# Patient Record
Sex: MALE | Race: WHITE | Employment: FULL TIME | URBAN - METROPOLITAN AREA
[De-identification: names, ages, dates, MRNs, and addresses within clinical notes are randomized per-mention and may not be internally consistent; named-entity substitution may affect disease eponyms.]

---

## 2020-11-01 ENCOUNTER — APPOINTMENT (OUTPATIENT)
Dept: VASCULAR SURGERY | Age: 35
End: 2020-11-01
Attending: EMERGENCY MEDICINE
Payer: COMMERCIAL

## 2020-11-01 ENCOUNTER — HOSPITAL ENCOUNTER (EMERGENCY)
Age: 35
Discharge: HOME | End: 2020-11-01
Attending: EMERGENCY MEDICINE
Payer: COMMERCIAL

## 2020-11-01 VITALS
RESPIRATION RATE: 15 BRPM | DIASTOLIC BLOOD PRESSURE: 92 MMHG | SYSTOLIC BLOOD PRESSURE: 143 MMHG | OXYGEN SATURATION: 95 % | HEART RATE: 69 BPM | TEMPERATURE: 98 F | BODY MASS INDEX: 35.29 KG/M2 | HEIGHT: 74 IN | WEIGHT: 275 LBS

## 2020-11-01 DIAGNOSIS — L03.116 CELLULITIS OF LEFT LOWER EXTREMITY: Primary | ICD-10-CM

## 2020-11-01 LAB — D DIMER PPP FEU-MCNC: 0.94 MG/L FEU (ref 0–0.65)

## 2020-11-01 PROCEDURE — 99283 EMERGENCY DEPT VISIT LOW MDM: CPT

## 2020-11-01 PROCEDURE — 93971 EXTREMITY STUDY: CPT

## 2020-11-01 PROCEDURE — 36415 COLL VENOUS BLD VENIPUNCTURE: CPT

## 2020-11-01 PROCEDURE — 85379 FIBRIN DEGRADATION QUANT: CPT

## 2020-11-01 PROCEDURE — 74011250637 HC RX REV CODE- 250/637: Performed by: EMERGENCY MEDICINE

## 2020-11-01 RX ORDER — CEPHALEXIN 250 MG/1
500 CAPSULE ORAL
Status: COMPLETED | OUTPATIENT
Start: 2020-11-01 | End: 2020-11-01

## 2020-11-01 RX ORDER — CEPHALEXIN 500 MG/1
500 CAPSULE ORAL 4 TIMES DAILY
Qty: 28 CAP | Refills: 0 | Status: SHIPPED | OUTPATIENT
Start: 2020-11-01 | End: 2020-11-08

## 2020-11-01 RX ADMIN — CEPHALEXIN 500 MG: 250 CAPSULE ORAL at 23:24

## 2020-11-02 NOTE — DISCHARGE INSTRUCTIONS
Take Benadryl at night for itching. Take Zyrtec during the day for itching. Take Keflex 4 times a day for the next 7 days. Return to the emergency department for any new or worsening symptoms such as chest pain, shortness of breath, worsening leg swelling, worsening redness, fever or any other concerns.

## 2020-11-02 NOTE — ED NOTES
I have reviewed discharge instructions with the patient. The patient verbalized understanding. Pt confirmed understanding of need for follow up with primary care provider. Pt is not in any current distress and shows no evidence of clinical instability. Pt left ambulatory  Pt family/friends are present. Pt left with all personal belongings. Pt states chief complaint has  improved with treatment provided    PT is alert and oriented x 4, Respiratory status is WDL, Cardiac system is HTN (patient has not taken BP medication today, educated on importance of taking daily BP medication, patient to take medication when he gets home)    Paperwork given and reviewed with patient, opportunity for questions/clarification given.     Patient Vitals for the past 4 hrs:   Temp Pulse Resp BP SpO2   11/01/20 2327  69 15 (!) 143/92 95 %   11/01/20 1932 98 °F (36.7 °C) 93 17 (!) 145/81 96 %

## 2020-11-02 NOTE — ED PROVIDER NOTES
38-year-old male presents with left ankle swelling for the past 2 days. States that it is itching for the past week. He thinks that he has an infection from scratching his ankle. He denies any fevers or chills. He denies any chest pain or shortness of breath there is no other medical concerns at this time. Ankle swelling           No past medical history on file. No past surgical history on file. No family history on file. Social History     Socioeconomic History    Marital status:      Spouse name: Not on file    Number of children: Not on file    Years of education: Not on file    Highest education level: Not on file   Occupational History    Not on file   Social Needs    Financial resource strain: Not on file    Food insecurity     Worry: Not on file     Inability: Not on file    Transportation needs     Medical: Not on file     Non-medical: Not on file   Tobacco Use    Smoking status: Not on file   Substance and Sexual Activity    Alcohol use: Not on file    Drug use: Not on file    Sexual activity: Not on file   Lifestyle    Physical activity     Days per week: Not on file     Minutes per session: Not on file    Stress: Not on file   Relationships    Social connections     Talks on phone: Not on file     Gets together: Not on file     Attends Episcopalian service: Not on file     Active member of club or organization: Not on file     Attends meetings of clubs or organizations: Not on file     Relationship status: Not on file    Intimate partner violence     Fear of current or ex partner: Not on file     Emotionally abused: Not on file     Physically abused: Not on file     Forced sexual activity: Not on file   Other Topics Concern    Not on file   Social History Narrative    Not on file         ALLERGIES: Patient has no known allergies. Review of Systems   All other systems reviewed and are negative.       Vitals:    11/01/20 1932   BP: (!) 145/81   Pulse: 93   Resp: 17   Temp: 98 °F (36.7 °C)   SpO2: 96%   Weight: 124.7 kg (275 lb)   Height: 6' 2\" (1.88 m)            Physical Exam  Vitals signs and nursing note reviewed. Constitutional:       Appearance: He is well-developed. HENT:      Head: Normocephalic and atraumatic. Eyes:      General: No scleral icterus. Neck:      Musculoskeletal: No neck rigidity. Trachea: No tracheal deviation. Cardiovascular:      Rate and Rhythm: Normal rate. Pulmonary:      Effort: Pulmonary effort is normal.   Abdominal:      General: There is no distension. Musculoskeletal:         General: No deformity. Left lower leg: Edema present. Skin:     General: Skin is warm and dry. Findings: Erythema (Mild left lower leg.) present. Neurological:      General: No focal deficit present. Mental Status: He is alert. Psychiatric:         Mood and Affect: Mood normal.          MDM  Number of Diagnoses or Management Options  Cellulitis of left lower extremity:   Diagnosis management comments: Ultrasound negative. Will treat empircally for cellulitis with keflex. Advised close pcp followup. Given return precautions.             Procedures

## 2020-11-02 NOTE — ED TRIAGE NOTES
Pt ambulatory to triage with mask, c/o left ankle swelling x 2 days and itching x 1 week. Pt states \" I think I have infection. I have been scratching my ankle and it started to swell. \"

## 2020-11-13 ENCOUNTER — APPOINTMENT (OUTPATIENT)
Dept: GENERAL RADIOLOGY | Age: 35
End: 2020-11-13
Attending: PHYSICIAN ASSISTANT
Payer: COMMERCIAL

## 2020-11-13 ENCOUNTER — HOSPITAL ENCOUNTER (EMERGENCY)
Age: 35
Discharge: HOME | End: 2020-11-13
Attending: STUDENT IN AN ORGANIZED HEALTH CARE EDUCATION/TRAINING PROGRAM
Payer: COMMERCIAL

## 2020-11-13 VITALS
BODY MASS INDEX: 35.29 KG/M2 | DIASTOLIC BLOOD PRESSURE: 109 MMHG | TEMPERATURE: 98.1 F | OXYGEN SATURATION: 98 % | HEART RATE: 73 BPM | RESPIRATION RATE: 15 BRPM | SYSTOLIC BLOOD PRESSURE: 159 MMHG | WEIGHT: 275 LBS | HEIGHT: 74 IN

## 2020-11-13 DIAGNOSIS — R03.0 ELEVATED BLOOD PRESSURE READING: ICD-10-CM

## 2020-11-13 DIAGNOSIS — L03.116 CELLULITIS OF LEFT LOWER EXTREMITY: Primary | ICD-10-CM

## 2020-11-13 LAB
ALBUMIN SERPL-MCNC: 4.2 G/DL (ref 3.5–5)
ALBUMIN/GLOB SERPL: 1.1 {RATIO} (ref 1.1–2.2)
ALP SERPL-CCNC: 97 U/L (ref 45–117)
ALT SERPL-CCNC: 56 U/L (ref 12–78)
ANION GAP SERPL CALC-SCNC: 4 MMOL/L (ref 5–15)
AST SERPL-CCNC: 26 U/L (ref 15–37)
BASOPHILS # BLD: 0.2 K/UL (ref 0–0.1)
BASOPHILS NFR BLD: 2 % (ref 0–1)
BILIRUB SERPL-MCNC: 0.6 MG/DL (ref 0.2–1)
BUN SERPL-MCNC: 16 MG/DL (ref 6–20)
BUN/CREAT SERPL: 16 (ref 12–20)
CALCIUM SERPL-MCNC: 9 MG/DL (ref 8.5–10.1)
CHLORIDE SERPL-SCNC: 107 MMOL/L (ref 97–108)
CO2 SERPL-SCNC: 26 MMOL/L (ref 21–32)
CREAT SERPL-MCNC: 0.98 MG/DL (ref 0.7–1.3)
DIFFERENTIAL METHOD BLD: ABNORMAL
EOSINOPHIL # BLD: 1.9 K/UL (ref 0–0.4)
EOSINOPHIL NFR BLD: 18 % (ref 0–7)
ERYTHROCYTE [DISTWIDTH] IN BLOOD BY AUTOMATED COUNT: 12.1 % (ref 11.5–14.5)
GLOBULIN SER CALC-MCNC: 3.7 G/DL (ref 2–4)
GLUCOSE SERPL-MCNC: 93 MG/DL (ref 65–100)
HCT VFR BLD AUTO: 45.2 % (ref 36.6–50.3)
HGB BLD-MCNC: 15.5 G/DL (ref 12.1–17)
IMM GRANULOCYTES # BLD AUTO: 0 K/UL
IMM GRANULOCYTES NFR BLD AUTO: 0 %
LYMPHOCYTES # BLD: 2.7 K/UL (ref 0.8–3.5)
LYMPHOCYTES NFR BLD: 26 % (ref 12–49)
MCH RBC QN AUTO: 30.5 PG (ref 26–34)
MCHC RBC AUTO-ENTMCNC: 34.3 G/DL (ref 30–36.5)
MCV RBC AUTO: 89 FL (ref 80–99)
MONOCYTES # BLD: 1 K/UL (ref 0–1)
MONOCYTES NFR BLD: 10 % (ref 5–13)
MYELOCYTES NFR BLD MANUAL: 1 %
NEUTS SEG # BLD: 4.4 K/UL (ref 1.8–8)
NEUTS SEG NFR BLD: 43 % (ref 32–75)
NRBC # BLD: 0 K/UL (ref 0–0.01)
NRBC BLD-RTO: 0 PER 100 WBC
PLATELET # BLD AUTO: 219 K/UL (ref 150–400)
PMV BLD AUTO: 10.2 FL (ref 8.9–12.9)
POTASSIUM SERPL-SCNC: 4 MMOL/L (ref 3.5–5.1)
PROT SERPL-MCNC: 7.9 G/DL (ref 6.4–8.2)
RBC # BLD AUTO: 5.08 M/UL (ref 4.1–5.7)
RBC MORPH BLD: ABNORMAL
SODIUM SERPL-SCNC: 137 MMOL/L (ref 136–145)
WBC # BLD AUTO: 10.3 K/UL (ref 4.1–11.1)

## 2020-11-13 PROCEDURE — 80053 COMPREHEN METABOLIC PANEL: CPT

## 2020-11-13 PROCEDURE — 73610 X-RAY EXAM OF ANKLE: CPT

## 2020-11-13 PROCEDURE — 85025 COMPLETE CBC W/AUTO DIFF WBC: CPT

## 2020-11-13 PROCEDURE — 36415 COLL VENOUS BLD VENIPUNCTURE: CPT

## 2020-11-13 PROCEDURE — 99281 EMR DPT VST MAYX REQ PHY/QHP: CPT

## 2020-11-13 RX ORDER — DOXYCYCLINE HYCLATE 100 MG
100 TABLET ORAL 2 TIMES DAILY
Qty: 20 TAB | Refills: 0 | Status: SHIPPED | OUTPATIENT
Start: 2020-11-13 | End: 2020-11-23

## 2020-11-13 NOTE — ED PROVIDER NOTES
Date of Service:  11/13/2020    Patient:  Adry Quevedo    Chief Complaint:  Hypertension and Skin Problem       HPI:  Adry Quevedo is a 52-year-old male with no significant past medical history presenting today for left ankle swelling, redness. Was seen about a week and a half ago, placed on Keflex 4 times daily, stated that he finished the Keflex a couple days ago with improvement of symptoms, but not complete resolution. Since then states he continues to notice redness and swelling on his left ankle, states he has been scratching at it, but denies any open wounds. Patient denies any fever or chills, chest pain, shortness of breath, abdominal pain, nausea, vomiting, diarrhea. Denies any known trauma or injury to left ankle, denies any bug bites or scratches. Patient also endorses elevated blood pressure for the past couple days, denies headaches, vision changes, chest pain. No past medical history on file. No past surgical history on file. No family history on file.     Social History     Socioeconomic History    Marital status:      Spouse name: Not on file    Number of children: Not on file    Years of education: Not on file    Highest education level: Not on file   Occupational History    Not on file   Social Needs    Financial resource strain: Not on file    Food insecurity     Worry: Not on file     Inability: Not on file    Transportation needs     Medical: Not on file     Non-medical: Not on file   Tobacco Use    Smoking status: Not on file   Substance and Sexual Activity    Alcohol use: Not on file    Drug use: Not on file    Sexual activity: Not on file   Lifestyle    Physical activity     Days per week: Not on file     Minutes per session: Not on file    Stress: Not on file   Relationships    Social connections     Talks on phone: Not on file     Gets together: Not on file     Attends Pentecostal service: Not on file     Active member of club or organization: Not on file     Attends meetings of clubs or organizations: Not on file     Relationship status: Not on file    Intimate partner violence     Fear of current or ex partner: Not on file     Emotionally abused: Not on file     Physically abused: Not on file     Forced sexual activity: Not on file   Other Topics Concern    Not on file   Social History Narrative    Not on file         ALLERGIES: Patient has no known allergies. Review of Systems   Constitutional: Negative for chills and fever. HENT: Negative for congestion and sore throat. Eyes: Negative for pain. Respiratory: Negative for cough and shortness of breath. Cardiovascular: Negative for chest pain and palpitations. Gastrointestinal: Negative for abdominal pain, diarrhea, nausea and vomiting. Genitourinary: Negative for dysuria, frequency and urgency. Musculoskeletal: Negative for back pain and neck pain. Skin: Negative for rash and wound. Neurological: Negative for dizziness, light-headedness and headaches. Vitals:    11/13/20 1353   BP: (!) 159/109   Pulse: 73   Resp: 15   Temp: 98.1 °F (36.7 °C)   SpO2: 98%   Weight: 124.7 kg (275 lb)   Height: 6' 2\" (1.88 m)            Physical Exam  Vitals signs and nursing note reviewed. Constitutional:       Appearance: Normal appearance. HENT:      Head: Normocephalic and atraumatic. Nose: Nose normal.   Eyes:      Extraocular Movements: Extraocular movements intact. Conjunctiva/sclera: Conjunctivae normal.      Pupils: Pupils are equal, round, and reactive to light. Neck:      Musculoskeletal: Normal range of motion and neck supple. Cardiovascular:      Rate and Rhythm: Normal rate and regular rhythm. Pulses: Normal pulses. Radial pulses are 2+ on the right side and 2+ on the left side. Posterior tibial pulses are 2+ on the right side and 2+ on the left side. Heart sounds: Normal heart sounds.    Pulmonary:      Effort: Pulmonary effort is normal.      Breath sounds: Normal breath sounds. Abdominal:      General: Abdomen is flat. Bowel sounds are normal.      Palpations: Abdomen is soft. Tenderness: There is no abdominal tenderness. There is no right CVA tenderness, left CVA tenderness, guarding or rebound. Musculoskeletal: Normal range of motion. Skin:     General: Skin is warm and dry. Capillary Refill: Capillary refill takes less than 2 seconds. Neurological:      General: No focal deficit present. Mental Status: He is alert and oriented to person, place, and time. Mental status is at baseline. Psychiatric:         Mood and Affect: Mood normal.         Behavior: Behavior normal.         Thought Content: Thought content normal.          MDM  Number of Diagnoses or Management Options  Cellulitis of left lower extremity:   Elevated blood pressure reading:   Diagnosis management comments: Labs Reviewed  CBC WITH AUTOMATED DIFF - Abnormal; Notable for the following components:     EOSINOPHILS                   18 (*)                 BASOPHILS                     2 (*)                  MYELOCYTES                    1 (*)                  ABS. EOSINOPHILS              1.9 (*)                ABS. BASOPHILS                0.2 (*)             All other components within normal limits  METABOLIC PANEL, COMPREHENSIVE - Abnormal; Notable for the following components:     Anion gap                     4 (*)               All other components within normal limits    IMAGING:  XR ANKLE LT MIN 3 V   Final Result    IMPRESSION: No acute abnormality. DECISION MAKING:  Adry Quevedo is a 38 y.o. male who comes in as above. Left ankle swelling, redness, previously on keflex with improvement, but not complete resolution of symptoms. Has been off antibiotics for about 2-3 days, and continues to notice symptoms. Also endorses elevated blood pressure readings for the past 2-3 days.  Denies headaches, vision changes, chest pain, or any additional symptoms. Patient resting comfortably, no acute distress. Denies trauma/injury to ankle. Weight bearing, full ROM, sensation intact, 2+ pulses, left lateral ankle erythematous, TTP. Full ROM without pain. Blood work, imaging reassuring, no clear etiology for symptoms. All results discussed with patient. Patient management discussed with attending physician. Start doxycyline BID x 10 days, please see PCP within 2 days for wound check and blood pressure check/management. Return to ED if any worsening or severe symptoms. IMPRESSION:  Cellulitis of left lower extremity  (primary encounter diagnosis)  Elevated blood pressure reading    DISPOSITION:  Discharged      Discharge Medication List as of 11/13/2020  3:17 PM         Follow-up Information     Follow up With Specialties Details Why Contact Info    Onofre Ornelas MD Family Medicine Schedule an appointment as soon as   possible for a visit in 1 week  [address] [facility]  [phone]      [facility] EMERGENCY DEPT Emergency Medicine Go to  If symptoms worsen [address] [place] [address]  [phone]          The patient is asked to follow-up with their primary care provider in the next several days. They are to call tomorrow for an appointment. The patient is asked to return promptly for any increased concerns or worsening of symptoms. They can return to this emergency department or any other emergency department. ED Course as of Nov 13 1506 Fri Nov 13, 2020   1358 1:58 PM  I have evaluated the patient as the Provider in Triage. I have reviewed His vital signs and the triage nurse assessment. I have talked with the patient and any available family and advised that I am the provider in triage and have ordered the appropriate study to initiate their work up based on the clinical presentation during my assessment.   I have advised that the patient will be accommodated in the Main ED as soon as possible. I have also requested to contact the triage nurse or myself immediately if the patient experiences any changes in their condition during this brief waiting period.   Heriberto Monet    [EK]      ED Course User Index  [EK] Josefa Booker PA-C       Procedures

## 2020-11-13 NOTE — Clinical Note
[address] 
[facility] EMERGENCY DEPT 
[address] [facility] [id number] [phone] Work/School Note Date: 11/13/2020 To Whom It May concern: 
 
 
Heron Arrington was seen and treated today in the emergency room by the following provider(s): 
Attending Provider: Florinda Vera DO Physician Assistant: Emerson Toney PA-C. Heron Arrington is excused from work/school on 11/13/20. He is clear to return to work/school on 11/14/20. Sincerely, Karoline Padilla PA-C

## 2020-11-13 NOTE — LETTER
Kris Duckworth 
[facility] EMERGENCY DEPT 
[address] [facility] [place] [id number] [phone] Work/School Note Date: 11/13/2020 To Whom It May concern: 
 
Terese Kerr was seen and treated today in the emergency room by the following provider(s): 
Attending Provider: Satya Aguilar DO Physician Assistant: Tanner Alvarez PA-C. Please keep left leg elevated above level of heart Sincerely, Jorden Jules PA-C

## 2020-11-13 NOTE — ED TRIAGE NOTES
Dx'd with cellulitis to left foot 2 weeks ago. Reports improvement of swelling after keflex. States that he did not take keflex as rx'd because medicine was \"harsh\" and could not take  Now presents with \"ulcer\" to left ankle. Circular area is red without ulceration.      Took hydrochlorothiazide PTA

## 2020-12-31 ENCOUNTER — TELEPHONE (OUTPATIENT)
Dept: FAMILY MEDICINE CLINIC | Age: 35
End: 2020-12-31

## 2020-12-31 NOTE — TELEPHONE ENCOUNTER
Called pt, did not receive an answer and unable to leave a message. Dr. Haleigh Rincon is able to see pt in February. Pt will be seen for cellulitis.

## 2021-04-12 ENCOUNTER — HOSPITAL ENCOUNTER (OUTPATIENT)
Dept: VASCULAR SURGERY | Age: 36
Discharge: HOME | End: 2021-04-12
Attending: FAMILY MEDICINE
Payer: COMMERCIAL

## 2021-04-12 ENCOUNTER — TRANSCRIBE ORDER (OUTPATIENT)
Dept: SCHEDULING | Age: 36
End: 2021-04-12

## 2021-04-12 DIAGNOSIS — M79.643 HAND PAIN: ICD-10-CM

## 2021-04-12 DIAGNOSIS — R79.89 POSITIVE D DIMER: ICD-10-CM

## 2021-04-12 DIAGNOSIS — R79.89 POSITIVE D DIMER: Primary | ICD-10-CM

## 2021-04-12 PROCEDURE — 93971 EXTREMITY STUDY: CPT

## 2021-04-13 ENCOUNTER — OFFICE VISIT (OUTPATIENT)
Dept: CARDIOLOGY CLINIC | Age: 36
End: 2021-04-13

## 2021-04-13 VITALS
RESPIRATION RATE: 18 BRPM | OXYGEN SATURATION: 99 % | HEART RATE: 87 BPM | BODY MASS INDEX: 33.75 KG/M2 | SYSTOLIC BLOOD PRESSURE: 140 MMHG | DIASTOLIC BLOOD PRESSURE: 80 MMHG | WEIGHT: 263 LBS | HEIGHT: 74 IN

## 2021-04-13 DIAGNOSIS — R07.89 CHEST TIGHTNESS: ICD-10-CM

## 2021-04-13 DIAGNOSIS — I73.1: ICD-10-CM

## 2021-04-13 DIAGNOSIS — F17.200 SMOKER: ICD-10-CM

## 2021-04-13 DIAGNOSIS — R20.0 NUMBNESS OF FINGERS: Primary | ICD-10-CM

## 2021-04-13 DIAGNOSIS — R09.89 ABSENT PULSE IN UPPER EXTREMITY: ICD-10-CM

## 2021-04-13 PROCEDURE — 99244 OFF/OP CNSLTJ NEW/EST MOD 40: CPT | Performed by: SPECIALIST

## 2021-04-13 PROCEDURE — 93000 ELECTROCARDIOGRAM COMPLETE: CPT | Performed by: SPECIALIST

## 2021-04-13 RX ORDER — NAPROXEN 500 MG/1
TABLET ORAL
COMMUNITY
Start: 2021-04-09 | End: 2021-04-17

## 2021-04-13 RX ORDER — MELATONIN 5 MG
5 CAPSULE ORAL
COMMUNITY

## 2021-04-13 RX ORDER — DIPHENHYDRAMINE HCL 25 MG
25 CAPSULE ORAL
COMMUNITY

## 2021-04-13 RX ORDER — AMLODIPINE BESYLATE 2.5 MG/1
TABLET ORAL
COMMUNITY
Start: 2021-04-09 | End: 2021-04-17 | Stop reason: SDUPTHER

## 2021-04-13 NOTE — PROGRESS NOTES
Visit Vitals  BP (!) 140/80 (BP 1 Location: Left arm, BP Patient Position: Sitting, BP Cuff Size: Adult)   Pulse 87   Resp 18   Ht 6' 2\" (1.88 m)   Wt 263 lb (119.3 kg)   SpO2 99%   BMI 33.77 kg/m²

## 2021-04-13 NOTE — PROGRESS NOTES
Mauricio Soares     1985       Juan C Heard MD, [facility]  Date of Visit-4/13/2021   PCP is Joanne Aguirre MD   [facility] [place]  [facility]  HPI:  Mauricio Soares is a 38 y.o. male   New pt referred by Joanne Aguirre MD. He reports feeling difficulty with his fingers and arterial insufficiency. Takes Amlodipine. We have records from his PCP noting pain and swelling in his fingers. He is a student in nursing school. Smokes a half pack a day with daily alcohol. He has an LDL cholesterol of 139. He had a venous upper extremity doppler yesterday of the right which was normal.     He states that he has pain in the fingers on his right hand. The pain started in November and his fingers also started to turn blue when the cold or touching cold objects around the same time. He notes his fingers also turn blue if he runs hot water over them. He reports that the fingers on his left hand are starting to turn blue too. He has never had surgery on his right arm. He states he started smoking again in the fall and is trying to stop. He states the last time he smoked was Thursday. Denies chest pain, edema, syncope or shortness of breath at rest, has no tachycardia, palpitations or sense of arrhythmia. EKG: SR WNL normal axis and intervals     Assessment/Plan:     · His right three middle fingers are bluish after he had immersed them in water and the nail beds are also bluish. · There are strong radials bilaterally but I am having difficulty with the ulnars. I discussed with Dr. Gordon Stephenson agrees about 20% population may not have an ulnar and he has a excellent radial.    · Will plan arterial dopplers of bilateral upper extremities. · Our differential would include Beurger's disease and I have advised him to completely stop smoking.    · He will get a doppler on his extremities tough it seems to probably be a perfusion abnormalities that are local to the fingers and I don't think is systemic. · He also complains of chest tightness so we will get a stress echo. Thromboangiitis obliterans Buerger's disease  Commonly affect small to medium size arteries nonatherosclerotic segmental inflammatory disease in  young smokers with ischemic distal digital ulcers or digital gangrene sometimes in this case we see distal extremity skin discoloration and smoking cessation is most important aspect. This would be consistent with cold sensitivity with the initial presentation of NATASHA ia more common with a Raynaud's phenomenon in the winter. Here there is no ulcer present but there is significant discoloration right greater than left   Patient needs detailed vascular examination and physical exam cannot distinguish arterial occlusive disease from NATASHA  and other etiologies patient should have a wrist brachial index and then digital studies: Deanna Alvin test     Have lab work( check first with previous labs)  to exclude other entities including lupus, connective tissue disorder, scleroderma, Crest will include sed rate CRP HOME rheumatoid factor CBC Chem-7 LFTs renal function serologies as above including coagulation test if severe can consider factor V and if necessary toxicology screen consider imaging of upper and lower extremities and aorta with MRI or CT occasional therapy with vasodilators or alpha antagonist may be helpful    Patient Instructions   You have been scheduled for a vascular test and a stress echocardiogram. Please arrive 15 minutes prior to your appointment. Wear comfortable clothes and shoes. Please do not eat or drink anything other than water two hours prior to test. We will see you back a few days after to go over testing.       Future Appointments   Date Time Provider José Miguel Vincent   5/18/2021  9:00 AM VASCULAR, MONICA RUST AMB   5/18/2021 10:00 AM ECHO, MONICA RUST AMB   5/18/2021 10:00 AM STRESSECHMONICA NOEL AMB   5/21/2021 10:20 AM MD OLIVA Chandra AMB          Impression:   1. Numbness of fingers    2. NATASHA (thromboangiitis obliterans) (HCC)    3. Chest tightness    4. Smoker    5. Absent pulse in upper extremity       Cardiac History:   No specialty comments available. ROS-except as noted above. . A complete cardiac and respiratory are reviewed and negative except as above ; Resp-denies wheezing  or productive cough,. Const- No unusual weight loss or fever; Neuro-no recent seizure or CVA ; GI- No BRBPR, abdom pain, bloating ; - no  hematuria   see supplement sheet, initialed and to be scanned by staff  No past medical history on file. Social Hx=      Exam and Labs:  BP (!) 140/80 (BP 1 Location: Left arm, BP Patient Position: Sitting, BP Cuff Size: Adult)   Pulse 87   Resp 18   Ht 6' 2\" (1.88 m)   Wt 263 lb (119.3 kg)   SpO2 99%   BMI 33.77 kg/m² Constitutional:  NAD, comfortable  Head: NC,AT. Eyes: No scleral icterus. Neck:  Neck supple. No JVD present. Throat: moist mucous membranes. Chest: Effort normal & normal respiratory excursion . Neurological: alert, conversant and oriented . Skin: Skin is not cold. No obvious systemic rash noted. Not diaphoretic. No erythema. Psychiatric:  Grossly normal mood and affect. Behavior appears normal. Extremities: His right three middle fingers are bluish after he had immersed them in water and the nail beds are also bluish. There are strong radials bilaterally but I am having difficulty with the ulnars. no clubbing or cyanosis. Abdomen: non distended    Lungs:breath sounds normal. No stridor. distress, wheezes or  Rales. Heart: normal rate, regular rhythm, normal S1, S2, no murmurs, rubs, clicks or gallops , PMI non displaced. Edema: Edema is none.   No results found for: CHOL, CHOLX, CHLST, CHOLV, HDL, HDLP, LDL, LDLC, DLDLP, TGLX, TRIGL, TRIGP, CHHD, CHHDX  Lab Results   Component Value Date/Time    Sodium 137 11/13/2020 02:21 PM    Potassium 4.0 11/13/2020 02:21 PM Chloride 107 11/13/2020 02:21 PM    CO2 26 11/13/2020 02:21 PM    Anion gap 4 (L) 11/13/2020 02:21 PM    Glucose 93 11/13/2020 02:21 PM    BUN 16 11/13/2020 02:21 PM    Creatinine 0.98 11/13/2020 02:21 PM    BUN/Creatinine ratio 16 11/13/2020 02:21 PM    GFR est AA >60 11/13/2020 02:21 PM    GFR est non-AA >60 11/13/2020 02:21 PM    Calcium 9.0 11/13/2020 02:21 PM      Wt Readings from Last 3 Encounters:   04/13/21 263 lb (119.3 kg)   11/13/20 275 lb (124.7 kg)   11/01/20 275 lb (124.7 kg)      BP Readings from Last 3 Encounters:   04/13/21 (!) 140/80   11/13/20 (!) 159/109   11/01/20 (!) 143/92      Current Outpatient Medications   Medication Sig    amLODIPine (NORVASC) 2.5 mg tablet     naproxen (NAPROSYN) 500 mg tablet     melatonin 5 mg cap capsule Take 5 mg by mouth nightly.  diphenhydrAMINE (BenadryL) 25 mg capsule Take 25 mg by mouth every six (6) hours as needed. No current facility-administered medications for this visit. Impression see above.       Written by Rita Munguia, as dictated by Goldie Juan MD.

## 2021-04-13 NOTE — PATIENT INSTRUCTIONS
You have been scheduled for a vascular test and a stress echocardiogram. Please arrive 15 minutes prior to your appointment. Wear comfortable clothes and shoes. Please do not eat or drink anything other than water two hours prior to test. We will see you back a few days after to go over testing.

## 2021-04-13 NOTE — Clinical Note
4/13/2021 Patient: Jr Marquis YOB: 1985 Date of Visit: 4/13/2021 Brenna Atkins MD 
[address] [facility] [address] [place] [id number] Via Fax: [phone] Dear Brenna Atkins MD, Thank you for referring Mr. Katy Cardenas to [address]  for evaluation. My notes for this consultation are attached. If you have questions, please do not hesitate to call me. I look forward to following your patient along with you.  
 
 
Sincerely, 
 
Beth Leonardo MD

## 2021-04-15 ENCOUNTER — TELEPHONE (OUTPATIENT)
Dept: CARDIOLOGY CLINIC | Age: 36
End: 2021-04-15

## 2021-04-15 DIAGNOSIS — R20.0 NUMBNESS OF FINGERS: Primary | ICD-10-CM

## 2021-04-15 DIAGNOSIS — Z87.891 HISTORY OF SMOKING: ICD-10-CM

## 2021-04-15 NOTE — TELEPHONE ENCOUNTER
Patient is requesting to speak with Dr. Alicia Khan. Patient states that at his last appointment with Dr. Alicia Khan he spoke with him about complications with his fingers and that over the last few days it has gone from bad to worse and he is worried that he is about to lose his fingers and is looking for guidance. Please advise.     Phone: [phone]

## 2021-04-16 NOTE — TELEPHONE ENCOUNTER
Not sure we can expedite testing as OP  Future Appointments   Date Time Provider José Miguel Vincent   5/18/2021  9:00 AM VASCULAR, MONICA RUST AMB   5/18/2021 10:00 AM ECHO, MONICA RUST AMB   5/18/2021 10:00 AM STRESSECHO, MONICA RUST AMB   5/21/2021 10:20 AM Juan C Narvaez MD [facility] AMB    Suggest he go to ER in am and have them do dopplers there at [facility] and they can decide if he needs Vascular Surgery

## 2021-04-16 NOTE — TELEPHONE ENCOUNTER
Verified patient with two types of identifiers. Spoke to patient at length and advised him to proceed to the ER now.  Updated MD.

## 2021-04-17 ENCOUNTER — APPOINTMENT (OUTPATIENT)
Dept: VASCULAR SURGERY | Age: 36
End: 2021-04-17
Attending: EMERGENCY MEDICINE
Payer: COMMERCIAL

## 2021-04-17 ENCOUNTER — HOSPITAL ENCOUNTER (EMERGENCY)
Age: 36
Discharge: HOME | End: 2021-04-17
Attending: EMERGENCY MEDICINE
Payer: COMMERCIAL

## 2021-04-17 VITALS
OXYGEN SATURATION: 100 % | DIASTOLIC BLOOD PRESSURE: 97 MMHG | HEART RATE: 93 BPM | SYSTOLIC BLOOD PRESSURE: 147 MMHG | TEMPERATURE: 97.8 F | RESPIRATION RATE: 18 BRPM

## 2021-04-17 DIAGNOSIS — I73.1 BUERGER'S DISEASE (HCC): ICD-10-CM

## 2021-04-17 DIAGNOSIS — L03.116 CELLULITIS OF LEFT LOWER EXTREMITY: ICD-10-CM

## 2021-04-17 DIAGNOSIS — I73.9 VASOSPASM (HCC): Primary | ICD-10-CM

## 2021-04-17 LAB
ALBUMIN SERPL-MCNC: 3.9 G/DL (ref 3.5–5)
ALBUMIN/GLOB SERPL: 1.1 {RATIO} (ref 1.1–2.2)
ALP SERPL-CCNC: 112 U/L (ref 45–117)
ALT SERPL-CCNC: 48 U/L (ref 12–78)
ANION GAP SERPL CALC-SCNC: 10 MMOL/L (ref 5–15)
AST SERPL-CCNC: 25 U/L (ref 15–37)
ATRIAL RATE: 80 BPM
BASOPHILS # BLD: 0.1 K/UL (ref 0–0.1)
BASOPHILS NFR BLD: 1 % (ref 0–1)
BILIRUB SERPL-MCNC: 0.3 MG/DL (ref 0.2–1)
BUN SERPL-MCNC: 28 MG/DL (ref 6–20)
BUN/CREAT SERPL: 33 (ref 12–20)
CALCIUM SERPL-MCNC: 8.9 MG/DL (ref 8.5–10.1)
CALCULATED P AXIS, ECG09: 69 DEGREES
CALCULATED R AXIS, ECG10: 57 DEGREES
CALCULATED T AXIS, ECG11: 98 DEGREES
CHLORIDE SERPL-SCNC: 105 MMOL/L (ref 97–108)
CO2 SERPL-SCNC: 23 MMOL/L (ref 21–32)
COMMENT, HOLDF: NORMAL
CREAT SERPL-MCNC: 0.85 MG/DL (ref 0.7–1.3)
DIAGNOSIS, 93000: NORMAL
DIFFERENTIAL METHOD BLD: ABNORMAL
EOSINOPHIL # BLD: 4 K/UL (ref 0–0.4)
EOSINOPHIL NFR BLD: 36 % (ref 0–7)
ERYTHROCYTE [DISTWIDTH] IN BLOOD BY AUTOMATED COUNT: 13.1 % (ref 11.5–14.5)
GLOBULIN SER CALC-MCNC: 3.6 G/DL (ref 2–4)
GLUCOSE SERPL-MCNC: 119 MG/DL (ref 65–100)
HCT VFR BLD AUTO: 45.8 % (ref 36.6–50.3)
HGB BLD-MCNC: 15.3 G/DL (ref 12.1–17)
IMM GRANULOCYTES # BLD AUTO: 0 K/UL (ref 0–0.04)
IMM GRANULOCYTES NFR BLD AUTO: 0 % (ref 0–0.5)
LYMPHOCYTES # BLD: 2.4 K/UL (ref 0.8–3.5)
LYMPHOCYTES NFR BLD: 21 % (ref 12–49)
MCH RBC QN AUTO: 29.4 PG (ref 26–34)
MCHC RBC AUTO-ENTMCNC: 33.4 G/DL (ref 30–36.5)
MCV RBC AUTO: 87.9 FL (ref 80–99)
MONOCYTES # BLD: 0.8 K/UL (ref 0–1)
MONOCYTES NFR BLD: 7 % (ref 5–13)
NEUTS SEG # BLD: 4 K/UL (ref 1.8–8)
NEUTS SEG NFR BLD: 35 % (ref 32–75)
NRBC # BLD: 0 K/UL (ref 0–0.01)
NRBC BLD-RTO: 0 PER 100 WBC
P-R INTERVAL, ECG05: 142 MS
PLATELET # BLD AUTO: 145 K/UL (ref 150–400)
PMV BLD AUTO: 10.4 FL (ref 8.9–12.9)
POTASSIUM SERPL-SCNC: 3.8 MMOL/L (ref 3.5–5.1)
PROT SERPL-MCNC: 7.5 G/DL (ref 6.4–8.2)
Q-T INTERVAL, ECG07: 354 MS
QRS DURATION, ECG06: 84 MS
QTC CALCULATION (BEZET), ECG08: 408 MS
RBC # BLD AUTO: 5.21 M/UL (ref 4.1–5.7)
RBC MORPH BLD: ABNORMAL
SAMPLES BEING HELD,HOLD: NORMAL
SODIUM SERPL-SCNC: 138 MMOL/L (ref 136–145)
TROPONIN I SERPL-MCNC: <0.05 NG/ML
VENTRICULAR RATE, ECG03: 80 BPM
WBC # BLD AUTO: 11.3 K/UL (ref 4.1–11.1)

## 2021-04-17 PROCEDURE — 74011250637 HC RX REV CODE- 250/637: Performed by: STUDENT IN AN ORGANIZED HEALTH CARE EDUCATION/TRAINING PROGRAM

## 2021-04-17 PROCEDURE — 99284 EMERGENCY DEPT VISIT MOD MDM: CPT | Performed by: INTERNAL MEDICINE

## 2021-04-17 PROCEDURE — 80053 COMPREHEN METABOLIC PANEL: CPT

## 2021-04-17 PROCEDURE — 93005 ELECTROCARDIOGRAM TRACING: CPT

## 2021-04-17 PROCEDURE — 85025 COMPLETE CBC W/AUTO DIFF WBC: CPT

## 2021-04-17 PROCEDURE — 36415 COLL VENOUS BLD VENIPUNCTURE: CPT

## 2021-04-17 PROCEDURE — 84484 ASSAY OF TROPONIN QUANT: CPT

## 2021-04-17 PROCEDURE — 99284 EMERGENCY DEPT VISIT MOD MDM: CPT

## 2021-04-17 PROCEDURE — 93923 UPR/LXTR ART STDY 3+ LVLS: CPT

## 2021-04-17 RX ORDER — CILOSTAZOL 100 MG/1
100 TABLET ORAL
Qty: 60 TAB | Refills: 0 | Status: SHIPPED | OUTPATIENT
Start: 2021-04-17 | End: 2021-05-17

## 2021-04-17 RX ORDER — AMLODIPINE BESYLATE 5 MG/1
5 TABLET ORAL DAILY
Qty: 30 TAB | Refills: 0 | Status: SHIPPED | OUTPATIENT
Start: 2021-04-17 | End: 2021-05-17

## 2021-04-17 RX ORDER — CEPHALEXIN 500 MG/1
500 CAPSULE ORAL 4 TIMES DAILY
Qty: 28 CAP | Refills: 0 | Status: SHIPPED | OUTPATIENT
Start: 2021-04-17 | End: 2021-04-24

## 2021-04-17 RX ORDER — CILOSTAZOL 50 MG/1
100 TABLET ORAL
Status: COMPLETED | OUTPATIENT
Start: 2021-04-17 | End: 2021-04-17

## 2021-04-17 RX ADMIN — CILOSTAZOL 100 MG: 50 TABLET ORAL at 10:29

## 2021-04-17 NOTE — ED TRIAGE NOTES
Pt referred by cardiologist to be seen in ER. Pt on arrival appears to have decreased perfusion to right finger tips, pain & cyanotic. On assessment pt has non-palpable ulna pulse on right and palpable radial and ulna on left. Pt has a history of smoking and healthcare provider informed him to stop smoking due to Buerger's Disease.  Per pt he stopped smoking but symptoms kept getting worst.

## 2021-04-17 NOTE — ED NOTES
8:19 AM  Change of shift. Care of patient taken over from Dr Flower Olmos ; H&P reviewed, bedside handoff complete. Awaiting ultrasound of the lower extremity and ABIs of the upper extremity    Upper extremity ultrasound reveals vasospasm but no arterial occlusion. Will discuss with cardiology. Likely start silo stays all, also complaining of lower extremity edema. Exam it seems that the left calf has multiple abrasions and there may be cellulitis that is contributing to this edema. He refused ultrasound. 10:09 AM       Discussed with Dr. Vaughn Cruz, cardiology. Will see the patient   Agrees with cilostazol and amlodipine.

## 2021-04-17 NOTE — ED PROVIDER NOTES
58-year-old male with no significant past medical history but family history of heart disease, presents to the emergency department noting multiple complaints. He presents stating that for the last several months he has had cyanosis and pain to his right index and middle fingertips that he states is significantly exacerbated by cold weather or with exposure to cold surfaces like in his job. He states that he was seen initially by his cardiologist who repairs ordered outpatient ultrasound venous and arterial studies of his right upper extremity to further evaluate. His venous study returned negative for any acute abnormalities but he has not done the arterial study yet. He is a smoker and states that he has quit smoking 9 days prior. Additionally he notes swelling to his left calf that started about 2 days prior. Denies any history of redness or pain to the area. He denies any history of prior DVT/PE, but expresses concern for this at this time. He also states that he has been having intermittent chest pains for quite some time and he has been concerned about his heart given his family history of heart disease. He has a scheduled outpatient cardiac stress test with cardiology office but has not done it yet. Cyanosis  Associated symptoms include leg swelling. Pertinent negatives include no fever, no headaches, no rhinorrhea, no sore throat, no neck pain, no cough, no chest pain, no vomiting, no abdominal pain and no rash. No past medical history on file. No past surgical history on file. No family history on file.     Social History     Socioeconomic History    Marital status:      Spouse name: Not on file    Number of children: Not on file    Years of education: Not on file    Highest education level: Not on file   Occupational History    Not on file   Social Needs    Financial resource strain: Not on file    Food insecurity     Worry: Not on file     Inability: Not on file  Transportation needs     Medical: Not on file     Non-medical: Not on file   Tobacco Use    Smoking status: Not on file   Substance and Sexual Activity    Alcohol use: Not on file    Drug use: Not on file    Sexual activity: Not on file   Lifestyle    Physical activity     Days per week: Not on file     Minutes per session: Not on file    Stress: Not on file   Relationships    Social connections     Talks on phone: Not on file     Gets together: Not on file     Attends Religion service: Not on file     Active member of club or organization: Not on file     Attends meetings of clubs or organizations: Not on file     Relationship status: Not on file    Intimate partner violence     Fear of current or ex partner: Not on file     Emotionally abused: Not on file     Physically abused: Not on file     Forced sexual activity: Not on file   Other Topics Concern    Not on file   Social History Narrative    Not on file         ALLERGIES: Patient has no known allergies. Review of Systems   Constitutional: Negative for activity change, appetite change, chills and fever. HENT: Negative for congestion, rhinorrhea, sinus pain, sneezing and sore throat. Eyes: Negative for photophobia and visual disturbance. Respiratory: Negative for cough and shortness of breath. Cardiovascular: Positive for leg swelling and cyanosis. Negative for chest pain. Gastrointestinal: Negative for abdominal pain, blood in stool, constipation, diarrhea, nausea and vomiting. Genitourinary: Negative for difficulty urinating, dysuria, flank pain, hematuria, penile pain and testicular pain. Musculoskeletal: Negative for arthralgias, back pain, myalgias and neck pain. Skin: Positive for color change. Negative for rash and wound. Neurological: Negative for syncope, weakness, light-headedness, numbness and headaches. Psychiatric/Behavioral: Negative for self-injury and suicidal ideas.    All other systems reviewed and are negative. Vitals:    04/17/21 0559   BP: (!) 147/97   Pulse: 93   Resp: 18   Temp: 97.8 °F (36.6 °C)   SpO2: 100%            Physical Exam  Vitals signs and nursing note reviewed. Constitutional:       General: He is not in acute distress. Appearance: Normal appearance. He is well-developed. He is not diaphoretic. HENT:      Head: Normocephalic and atraumatic. Nose: Nose normal.   Eyes:      Extraocular Movements: Extraocular movements intact. Conjunctiva/sclera: Conjunctivae normal.      Pupils: Pupils are equal, round, and reactive to light. Neck:      Musculoskeletal: Neck supple. Cardiovascular:      Rate and Rhythm: Normal rate and regular rhythm. Heart sounds: Normal heart sounds. Pulmonary:      Effort: Pulmonary effort is normal.      Breath sounds: Normal breath sounds. Abdominal:      General: There is no distension. Palpations: Abdomen is soft. Tenderness: There is no abdominal tenderness. Musculoskeletal:         General: No tenderness. Hands:       Left lower leg: Edema (Left calf swelling, nonpitting, nontender. No overlying redness) present. Skin:     General: Skin is warm and dry. Neurological:      General: No focal deficit present. Mental Status: He is alert and oriented to person, place, and time. Cranial Nerves: No cranial nerve deficit. Sensory: No sensory deficit. Motor: No weakness. Coordination: Coordination normal.          MDM   23-year-old male presents with left calf swelling as well as several months of dusky cyanosis to his fingertips. Patient is a smoker recently quit 9 days prior. Exam highly suggestive of Burger's disease. Labs returned reassuringly showing no significant abnormalities.   Right upper extremity arterial ultrasound and left lower extremity venous ultrasound were ordered to further evaluate and while the studies were pending his care was signed out to Dr. Valorie Perdomo at 8:00am.  Please see his note for further information regarding remainder of her care while in the ED. Procedures    623 EKG shows normal sinus rhythm with a rate of 80 bpm with nonspecific T wave flattening inferolaterally but no ST elevation or depression.

## 2021-04-17 NOTE — CONSULTS
Dr. Carol Hernandez. [phone]            Cardiology Consult/Progress Note      Requesting/referring provider: Lilliam Fox MD ,     Reason for Consult: Poor perfusion of toes and fingers    Assessment/Plan:  1. 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 History of smoking  3. Superimposed vasospasm causing pain in fingers and toes  4. Possible colitis of left ankle    Mr. Johnny Siddiqi was seen in the request of Dr. Jolynn Wood. Mr. Johnny Siddiqi has long-term history of smoking. Over the course of past many months he has developed progressive bluish discoloration of his fingers and toes with more prominent involvement of upper extremities. He also has black and blue spots underneath the toenails and fingernails which are likely ischemic in etiology. 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 Alternative diagnosis includes Raynaud's phenomenon     He quit smoking about 8 days ago. I believe it may take more than a few weeks for effects to manifest clearlyAssuming this is Buerger's disease . It would be reasonable to increase the dose of his amlodipine to 5 mg if there is a blood pressure room as well as add cilostazol. He may have potential cellulitis of lower extremity which can be treated with transient antibiotics. He did not require any admission at this point of time. Dr. Emelina Miller.  He has a follow-up with himal in 2 to 3 weeks. Had planned an outpatient follow-up with vascular surgery I specifically discussed the risk of amputation if he continues to smoke.   He voiced understanding and will come back to not reinitiating smoking again    Investigations ordered    []    High complexity decision making was performed  []    Patient is at high-risk of decompensation with multiple organ involvement  []    Complex/difficult social determinants of health outcomes      HPI: Melinda Harvey Pricilla Price, a 38y.o. year-old who presents for evaluation of finger discoloration and toe discoloration. History of long-term smoking with above-mentioned symptoms. He reports associated pain in fingers. Color of the fingers changes when he lifts and warm water. 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 He does not report of any loss of motor control but reports numbness in both fingers and toes. Since stopping smoking his symptoms are slightly improved. .  He  has no past medical history on file. Review of system:Patient reports no dyspnea/PND/Orthpnea/CP. He reports no cough/fever/focal neurological deficits/abdominal pain. All other systems negative except as above. No family history on file. Social History     Socioeconomic History    Marital status:      Spouse name: Not on file    Number of children: Not on file    Years of education: Not on file    Highest education level: Not on file      PE  Vitals:    04/17/21 0559 04/17/21 0734   BP: (!) 147/97    Pulse: 93    Resp: 18    Temp: 97.8 °F (36.6 °C)    SpO2: 100% 100%    There is no height or weight on file to calculate BMI. General:    Alert, cooperative, no distress. Psychiatric:    Normal Mood and affect    Eye/ENT:      Pupils equal, No asymmetry, Conjunctival pink. Able to hear voice at normal amplitude   Lungs:      Visibly symmetric chest expansion, No palpable tenderness. Clear to auscultation bilaterally. Heart[de-identified]    Regular rate and rhythm, S1, S2 normal, no murmur, click, rub or gallop. No JVD, Normal palpable peripheral pulses. No cyanosis   Abdomen:     Soft, non-tender. Bowel sounds normal. No masses,  No      organomegaly. Extremities:   Extremities normal, atraumatic, no edema. Bluish discoloration of toes and fingers. There are some blue and black dots in the subungual region likely ischemic in nature. Neurologic:   CN II-XII grossly intact.  No gross focal deficits Recent Labs:  No results found for: CHOL, CHOLX, CHLST, CHOLV, 839978, HDL, HDLP, LDL, LDLC, DLDLP, TGLX, TRIGL, TRIGP, CHHD, CHHDX  Lab Results   Component Value Date/Time    Creatinine 0.85 04/17/2021 06:31 AM     Lab Results   Component Value Date/Time    BUN 28 (H) 04/17/2021 06:31 AM     Lab Results   Component Value Date/Time    Potassium 3.8 04/17/2021 06:31 AM     No results found for: HBA1C, HGBE8, QWK6YQUV  Lab Results   Component Value Date/Time    HGB 15.3 04/17/2021 06:31 AM     Lab Results   Component Value Date/Time    PLATELET 105 (L) 28/09/1059 06:31 AM       Reviewed:  No past medical history on file. Social History     Tobacco Use   Smoking Status Not on file     Social History     Substance and Sexual Activity   Alcohol Use Not on file     No Known Allergies  No family history on file. No current facility-administered medications for this encounter. Current Outpatient Medications   Medication Sig    cilostazoL (PLETAL) 100 mg tablet Take 1 Tab by mouth Before breakfast and dinner for 30 days.  cephALEXin (Keflex) 500 mg capsule Take 1 Cap by mouth four (4) times daily for 7 days.  amLODIPine (NORVASC) 5 mg tablet Take 1 Tab by mouth daily for 30 days.  melatonin 5 mg cap capsule Take 5 mg by mouth nightly.  diphenhydrAMINE (BenadryL) 25 mg capsule Take 25 mg by mouth every six (6) hours as needed. Gerald Ceron MD04/17/21     ATTENTION:   This medical record was transcribed using an electronic medical records/speech recognition system. Although proofread, it may and can contain electronic, spelling and other errors. Corrections may be executed at a later time. Please feel free to contact us for any clarifications as needed.     [facility] [place]  [facility], [place], [place]. [phone]

## 2021-04-19 NOTE — TELEPHONE ENCOUNTER
A/p ER cards consult  With plan as in note  Assessment/Plan:  1. 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 History of smoking  3.   Superimposed vasospasm causing pain in fingers and toes

## 2021-04-19 NOTE — TELEPHONE ENCOUNTER
Pt went to ER and Dr Kana Zacarias adjusted his amlodipine (Norvasc)  And started Pletal   Future Appointments   Date Time Provider José Miguel Vincent   5/18/2021  9:00 AM VASCULAR, MONICA RUST AMB   5/18/2021 10:00 AM ECHOMONICA AMB   5/18/2021 10:00 AM STRESSECHOMONICA AMB   5/21/2021 10:20 AM Juan C Narvaez MD CAVREY BS AMB

## 2021-04-21 PROBLEM — R07.89 CHEST TIGHTNESS: Status: ACTIVE | Noted: 2021-04-21

## 2021-05-07 ENCOUNTER — HOSPITAL ENCOUNTER (EMERGENCY)
Age: 36
Discharge: HOME | End: 2021-05-07
Attending: EMERGENCY MEDICINE
Payer: COMMERCIAL

## 2021-05-07 ENCOUNTER — OFFICE VISIT (OUTPATIENT)
Dept: CARDIOLOGY CLINIC | Age: 36
End: 2021-05-07
Payer: COMMERCIAL

## 2021-05-07 VITALS
WEIGHT: 247 LBS | OXYGEN SATURATION: 96 % | HEART RATE: 90 BPM | HEIGHT: 74 IN | BODY MASS INDEX: 31.7 KG/M2 | SYSTOLIC BLOOD PRESSURE: 130 MMHG | DIASTOLIC BLOOD PRESSURE: 90 MMHG | RESPIRATION RATE: 16 BRPM

## 2021-05-07 VITALS
RESPIRATION RATE: 16 BRPM | DIASTOLIC BLOOD PRESSURE: 91 MMHG | OXYGEN SATURATION: 99 % | HEART RATE: 89 BPM | TEMPERATURE: 98.4 F | SYSTOLIC BLOOD PRESSURE: 147 MMHG

## 2021-05-07 DIAGNOSIS — I73.9 VASOSPASM (HCC): ICD-10-CM

## 2021-05-07 DIAGNOSIS — I73.1 BUERGER'S DISEASE (HCC): Primary | ICD-10-CM

## 2021-05-07 DIAGNOSIS — Z72.0 TOBACCO ABUSE: Primary | ICD-10-CM

## 2021-05-07 DIAGNOSIS — I96 GANGRENE (HCC): ICD-10-CM

## 2021-05-07 DIAGNOSIS — R23.0 CYANOSIS OF FINGERTIP: ICD-10-CM

## 2021-05-07 LAB
ALBUMIN SERPL-MCNC: 4.8 G/DL (ref 3.5–5)
ALBUMIN/GLOB SERPL: 1.5 {RATIO} (ref 1.1–2.2)
ALP SERPL-CCNC: 102 U/L (ref 45–117)
ALT SERPL-CCNC: 51 U/L (ref 12–78)
ANION GAP SERPL CALC-SCNC: 8 MMOL/L (ref 5–15)
AST SERPL-CCNC: 26 U/L (ref 15–37)
BASOPHILS # BLD: 0.2 K/UL (ref 0–0.1)
BASOPHILS NFR BLD: 2 % (ref 0–1)
BILIRUB SERPL-MCNC: 0.4 MG/DL (ref 0.2–1)
BUN SERPL-MCNC: 22 MG/DL (ref 6–20)
BUN/CREAT SERPL: 24 (ref 12–20)
CALCIUM SERPL-MCNC: 9.5 MG/DL (ref 8.5–10.1)
CHLORIDE SERPL-SCNC: 105 MMOL/L (ref 97–108)
CO2 SERPL-SCNC: 25 MMOL/L (ref 21–32)
COMMENT, HOLDF: NORMAL
CREAT SERPL-MCNC: 0.91 MG/DL (ref 0.7–1.3)
DIFFERENTIAL METHOD BLD: ABNORMAL
EOSINOPHIL # BLD: 1.2 K/UL (ref 0–0.4)
EOSINOPHIL NFR BLD: 14 % (ref 0–7)
ERYTHROCYTE [DISTWIDTH] IN BLOOD BY AUTOMATED COUNT: 13 % (ref 11.5–14.5)
GLOBULIN SER CALC-MCNC: 3.2 G/DL (ref 2–4)
GLUCOSE SERPL-MCNC: 107 MG/DL (ref 65–100)
HCT VFR BLD AUTO: 43.6 % (ref 36.6–50.3)
HGB BLD-MCNC: 14.7 G/DL (ref 12.1–17)
IMM GRANULOCYTES # BLD AUTO: 0 K/UL (ref 0–0.04)
IMM GRANULOCYTES NFR BLD AUTO: 0 % (ref 0–0.5)
LYMPHOCYTES # BLD: 2.1 K/UL (ref 0.8–3.5)
LYMPHOCYTES NFR BLD: 24 % (ref 12–49)
MCH RBC QN AUTO: 29.1 PG (ref 26–34)
MCHC RBC AUTO-ENTMCNC: 33.7 G/DL (ref 30–36.5)
MCV RBC AUTO: 86.3 FL (ref 80–99)
MONOCYTES # BLD: 0.6 K/UL (ref 0–1)
MONOCYTES NFR BLD: 7 % (ref 5–13)
NEUTS SEG # BLD: 4.6 K/UL (ref 1.8–8)
NEUTS SEG NFR BLD: 53 % (ref 32–75)
NRBC # BLD: 0 K/UL (ref 0–0.01)
NRBC BLD-RTO: 0 PER 100 WBC
PLATELET # BLD AUTO: 196 K/UL (ref 150–400)
PMV BLD AUTO: 10 FL (ref 8.9–12.9)
POTASSIUM SERPL-SCNC: 3.7 MMOL/L (ref 3.5–5.1)
PROT SERPL-MCNC: 8 G/DL (ref 6.4–8.2)
RBC # BLD AUTO: 5.05 M/UL (ref 4.1–5.7)
RBC MORPH BLD: ABNORMAL
SAMPLES BEING HELD,HOLD: NORMAL
SODIUM SERPL-SCNC: 138 MMOL/L (ref 136–145)
WBC # BLD AUTO: 8.7 K/UL (ref 4.1–11.1)

## 2021-05-07 PROCEDURE — 80053 COMPREHEN METABOLIC PANEL: CPT

## 2021-05-07 PROCEDURE — 85025 COMPLETE CBC W/AUTO DIFF WBC: CPT

## 2021-05-07 PROCEDURE — 99283 EMERGENCY DEPT VISIT LOW MDM: CPT

## 2021-05-07 PROCEDURE — 36415 COLL VENOUS BLD VENIPUNCTURE: CPT

## 2021-05-07 PROCEDURE — 99215 OFFICE O/P EST HI 40 MIN: CPT | Performed by: INTERNAL MEDICINE

## 2021-05-07 RX ORDER — HYDROCODONE BITARTRATE AND ACETAMINOPHEN 5; 325 MG/1; MG/1
1 TABLET ORAL
Qty: 18 TAB | Refills: 0 | Status: SHIPPED | OUTPATIENT
Start: 2021-05-07 | End: 2021-05-10

## 2021-05-07 RX ORDER — TRAMADOL HYDROCHLORIDE 50 MG/1
TABLET ORAL
COMMUNITY
Start: 2021-05-04 | End: 2021-05-07 | Stop reason: ALTCHOICE

## 2021-05-07 RX ORDER — HYDROXYZINE 25 MG/1
TABLET, FILM COATED ORAL
COMMUNITY
Start: 2021-05-05

## 2021-05-07 NOTE — CONSULTS
Vascular Surgery Consult    Reason:  Bluish 2nd, 3rd finger tips    HPI: Patient is 37 yo with 1 month history of bluish 2nd and 3rd toe tips and severe pain that he feels is worsening. He has been sent to the ER by his cardiologist where he has already been dx'd with possible thrombangiitis obliterans. He reports that he has stopped smoking. 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 I have seen him in the waiting room in the ER. Patient Active Problem List   Diagnosis Code    Chest tightness R07.89     No current facility-administered medications on file prior to encounter. Current Outpatient Medications on File Prior to Encounter   Medication Sig Dispense Refill    traMADoL (ULTRAM) 50 mg tablet       hydrOXYzine HCL (ATARAX) 25 mg tablet       cilostazoL (PLETAL) 100 mg tablet Take 1 Tab by mouth Before breakfast and dinner for 30 days. 60 Tab 0    amLODIPine (NORVASC) 5 mg tablet Take 1 Tab by mouth daily for 30 days. 30 Tab 0    melatonin 5 mg cap capsule Take 5 mg by mouth nightly.  diphenhydrAMINE (BenadryL) 25 mg capsule Take 25 mg by mouth every six (6) hours as needed. PE:  No data found. Right hand with palpable radial pulse; ulnar is less strong; radial is dominant vessel by chen's test  2nd and 3rd Finger tips bluish; small darkened ulcer of 3rd tip; dry/ no open wound; no discharge    Plan:  --I have explained that I based on the length and appearance of the finger tips, that we in vascular surgery do not have a surgical/interventional cure for this. Agree with nifedipine, pletal, asa--I have recommended to increase the ASA to 325. Consider hand specialist for another opinion.

## 2021-05-07 NOTE — ED PROVIDER NOTES
64-year-old male with past medical history of recent cellulitis and possible Buerger's disease. Patient states he was referred to emergency room by vascular surgeon, Russell Garcia. Has had duskiness, pain, increasing blueness of the distal second and third fingers of the right hand. Has seen his primary care, vascular surgeon, and a cardiologist.  Was told by vascular surgery that there was nothing to do for this condition and that he would have to continue to wait to see if there was improvement. Patient states she has difficulty at night sleeping due to increased pain overnight. Has tried NSAIDs, Tylenol, and tramadol with no improvement. Takes amlodipine, cilostazol, and aspirin for this recent condition. Vascular surgeon, Dr. Russell Garcia, saw patient in the ER waiting room and discussed with attending physician, Dr. Donavan Cherry, that there is nothing to do acutely for this condition. If patient wants another opinion he may be referred to orthopedic hand specialist.             No past medical history on file. No past surgical history on file. No family history on file.     Social History     Socioeconomic History    Marital status:      Spouse name: Not on file    Number of children: Not on file    Years of education: Not on file    Highest education level: Not on file   Occupational History    Not on file   Social Needs    Financial resource strain: Not on file    Food insecurity     Worry: Not on file     Inability: Not on file    Transportation needs     Medical: Not on file     Non-medical: Not on file   Tobacco Use    Smoking status: Not on file   Substance and Sexual Activity    Alcohol use: Not on file    Drug use: Not on file    Sexual activity: Not on file   Lifestyle    Physical activity     Days per week: Not on file     Minutes per session: Not on file    Stress: Not on file   Relationships    Social connections     Talks on phone: Not on file     Gets together: Not on file     Attends Hindu service: Not on file     Active member of club or organization: Not on file     Attends meetings of clubs or organizations: Not on file     Relationship status: Not on file    Intimate partner violence     Fear of current or ex partner: Not on file     Emotionally abused: Not on file     Physically abused: Not on file     Forced sexual activity: Not on file   Other Topics Concern    Not on file   Social History Narrative    Not on file         ALLERGIES: Patient has no known allergies. Review of Systems   Constitutional: Negative for fever. HENT: Negative for congestion and sore throat. Respiratory: Negative for cough and shortness of breath. Cardiovascular: Negative for chest pain. Gastrointestinal: Negative for nausea and vomiting. Genitourinary: Negative for dysuria. Musculoskeletal: Negative for myalgias. Pain, color change of distal second and third fingers of right hand   Skin: Negative for rash. Neurological: Negative for dizziness and weakness. There were no vitals filed for this visit. Physical Exam  Vitals signs and nursing note reviewed. Constitutional:       General: He is not in acute distress. HENT:      Head: Normocephalic. Nose: Nose normal.      Mouth/Throat:      Mouth: Mucous membranes are moist.   Eyes:      Extraocular Movements: Extraocular movements intact. Neck:      Musculoskeletal: Normal range of motion. Pulmonary:      Effort: Pulmonary effort is normal. No respiratory distress. Musculoskeletal:      Comments: Dusky discoloration of distal second and third digits of right hand with very sluggish capillary refill. Radial pulses are intact bilaterally, no abnormality of the other fingers or the hand. Skin:     General: Skin is dry. Findings: No rash. Neurological:      Mental Status: He is alert and oriented to person, place, and time.    Psychiatric:         Mood and Affect: Mood normal. MDM  Number of Diagnoses or Management Options  Buerger's disease ([facility] [address]  Cyanosis of fingertip  Diagnosis management comments: Patient likely with Buerger's disease who has been evaluated by vascular surgeon, Dr. Graciela John. Patient is wondering if there is any intervention that can be done. Dr. Graciela John discussed with attending physician, Dr. Kelly Montague, that he may be referred to hand specialist for another opinion but that there is no acute intervention necessary. Discussed this recommendation with the patient and referred to orthopedic hand surgeon, Dr. Paolo Deleon. Provided with a short-term prescription of pain medication to use at night. Return precautions were reviewed. Case was reviewed with attending physician, Dr. Kelly Montague       Amount and/or Complexity of Data Reviewed  Clinical lab tests: reviewed      Medications - No data to display  Labs Reviewed   CBC WITH AUTOMATED DIFF - Abnormal; Notable for the following components:       Result Value    EOSINOPHILS 14 (*)     BASOPHILS 2 (*)     ABS. EOSINOPHILS 1.2 (*)     ABS.  BASOPHILS 0.2 (*)     All other components within normal limits   METABOLIC PANEL, COMPREHENSIVE - Abnormal; Notable for the following components:    Glucose 107 (*)     BUN 22 (*)     BUN/Creatinine ratio 24 (*)     All other components within normal limits   SAMPLES BEING HELD     No orders to display            Procedures          Gena Saucedo PA-C  5/7/2021

## 2021-05-07 NOTE — ED TRIAGE NOTES
Triage: Pt arrives from vascular surgery office. He reports he has an arterial condition that they follow him for. He was referred here to see Dr. Ashley Song and likely admission.

## 2021-05-07 NOTE — LETTER
5/7/2021 Patient: Elizabeth Cohen YOB: 1985 Date of Visit: 5/7/2021 Luisana Krishnan [place], [address] [facility] [address] [facility] [id number] Via Fax: [phone] Bozena Rojo MD 
[address] [facility] [address] [facility] [phone] Via Fax: [phone] Dear CANDICE Joe MD, Thank you for referring Mr. Arthur Mclain to [address] [place], [place] [address]  for evaluation. My notes for this consultation are attached. If you have questions, please do not hesitate to call me. I look forward to following your patient along with you.  
 
 
Sincerely, 
 
Koko Ambrocio MD

## 2021-05-08 NOTE — PROGRESS NOTES
Dr. Bessy Cross. [phone]            Cardiology Consult/Progress Note      Requesting/referring provider: Patricio Boast, PA ,     Reason for Consult: Poor perfusion of toes and fingers    Assessment/Plan:  1. Progressive ischemic changes in fingers  2. History of smoking  3. 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 Kostas Parson was seen in the request of Dr. Hernan Gill. Mr. Kostas Parson has long-term history of smoking. Over the course of past many months he has developed progressive bluish discoloration of his fingers and toes with more prominent involvement of upper extremities. He also has black and blue spots underneath the toenails and fingernails which are likely ischemic in etiology. 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 Alternative diagnosis includes Raynaud's phenomenon/refractory vasospasm likely from extensive cold exposure    He quit smoking about 1 month ago. On his recent ER visit amlodipine and cilostazol was added but he continues to have progressive worsening of his fingers. He continues to take these medications despite basis symptoms are progressive. I am concerned that he is likely to have progressive gangrene and may end up likely losing his fingers. He has been asked to keep his fingers warm and avoid cold exposure any further. I also want him to urgently see a vascular surgeon in case any other options may be available. Also discussed experimental therapy such as intravenous prostaglandins and or arterial vasodilators but unfortunately the evidence to suggest that they are able to salvage/prevent amputation is anecdotal.  Similarly sympathectomy has been attempted with marginal results.     I personally talked to Dr. Juan Cervantes with vascular surgery who will see him at [facility] emergency room. Investigations ordered    [x]    High complexity decision making was performed  []    Patient is at high-risk of decompensation with multiple organ involvement  []    Complex/difficult social determinants of health outcomes      HPI: Evens Meraz, a 38y.o. year-old who presents for evaluation of finger discoloration and toe discoloration. History of long-term smoking with above-mentioned symptoms. He reports associated pain in fingers. Color of the fingers changes when he lifts and warm water. Since stopping smoking his symptoms initially had improved but now progressively getting worse. .   Initially feels some pain. He now report of any loss fine sensation and numbness in middle and index finger of his right hand which showed appearing worse compared to his last evaluation about 10 days ago. He  has no past medical history on file. Review of system:Patient reports no dyspnea/PND/Orthpnea/CP. He reports no cough/fever/focal neurological deficits/abdominal pain. All other systems negative except as above. No family history on file. Social History     Socioeconomic History    Marital status:      Spouse name: Not on file    Number of children: Not on file    Years of education: Not on file    Highest education level: Not on file      PE  Vitals:    05/07/21 1437   BP: (!) 130/90   Pulse: 90   Resp: 16   SpO2: 96%   Weight: 247 lb (112 kg)   Height: 6' 2\" (1.88 m)    Body mass index is 31.71 kg/m². General:    Alert, cooperative, no distress. Psychiatric:    Normal Mood and affect    Eye/ENT:      Pupils equal, No asymmetry, Conjunctival pink. Able to hear voice at normal amplitude   Lungs:      Visibly symmetric chest expansion, No palpable tenderness. Clear to auscultation bilaterally. Heart[de-identified]    Regular rate and rhythm, S1, S2 normal, no murmur, click, rub or gallop. No JVD, Normal palpable peripheral pulses. No cyanosis   Abdomen:     Soft, non-tender. Bowel sounds normal. No masses,  No      organomegaly. Extremities:   Extremities normal, atraumatic, no edema. Bluish discoloration of toes and fingers. There are some blue and black dots in the subungual region likely ischemic in nature. Additionally the bluish/purple discoloration on the tip of right middle and index finger appears to be more pronounced compared to last visit. Neurologic:   CN II-XII grossly intact. No gross focal deficits           Recent Labs:  No results found for: CHOL, CHOLX, CHLST, CHOLV, 739857, HDL, HDLP, LDL, LDLC, DLDLP, TGLX, TRIGL, TRIGP, CHHD, CHHDX  Lab Results   Component Value Date/Time    Creatinine 0.91 05/07/2021 04:43 PM     Lab Results   Component Value Date/Time    BUN 22 (H) 05/07/2021 04:43 PM     Lab Results   Component Value Date/Time    Potassium 3.7 05/07/2021 04:43 PM     No results found for: HBA1C, HGBE8, JRY0XDHQ, LPK8NWWX  Lab Results   Component Value Date/Time    HGB 14.7 05/07/2021 04:43 PM     Lab Results   Component Value Date/Time    PLATELET 182 96/13/6127 04:43 PM       Reviewed:  No past medical history on file. Social History     Tobacco Use   Smoking Status Not on file     Social History     Substance and Sexual Activity   Alcohol Use Not on file     No Known Allergies  No family history on file. Current Outpatient Medications   Medication Sig    hydrOXYzine HCL (ATARAX) 25 mg tablet     cilostazoL (PLETAL) 100 mg tablet Take 1 Tab by mouth Before breakfast and dinner for 30 days.  amLODIPine (NORVASC) 5 mg tablet Take 1 Tab by mouth daily for 30 days.  melatonin 5 mg cap capsule Take 5 mg by mouth nightly.  diphenhydrAMINE (BenadryL) 25 mg capsule Take 25 mg by mouth every six (6) hours as needed.  HYDROcodone-acetaminophen (Norco) 5-325 mg per tablet Take 1 Tab by mouth every four (4) hours as needed for Pain for up to 3 days. Max Daily Amount: 6 Tabs.      No current facility-administered medications for this visit. Arlene Nevarez MD05/07/21     ATTENTION:   This medical record was transcribed using an electronic medical records/speech recognition system. Although proofread, it may and can contain electronic, spelling and other errors. Corrections may be executed at a later time. Please feel free to contact us for any clarifications as needed.     [employer] [facility] [place]  [facility]. [phone]

## 2021-05-08 NOTE — ED NOTES
The patient was seen by the vascular surgeon Dr. Tina Alberto. He felt that there was no surgical intervention that would be helpful. No acute interventions or medications presently. He suggested the patient see either Dr. Eric Andersen whom Dr. Fernando Mcguire called, or see the hand MD at [facility]. Patient is given these instructions.

## 2022-03-20 PROBLEM — R07.89 CHEST TIGHTNESS: Status: ACTIVE | Noted: 2021-04-21

## 2023-05-14 RX ORDER — DIPHENHYDRAMINE HCL 25 MG
25 CAPSULE ORAL EVERY 6 HOURS PRN
COMMUNITY

## 2023-05-14 RX ORDER — HYDROXYZINE HYDROCHLORIDE 25 MG/1
TABLET, FILM COATED ORAL
COMMUNITY
Start: 2021-05-05